# Patient Record
Sex: MALE | Race: WHITE | NOT HISPANIC OR LATINO | Employment: OTHER | URBAN - NONMETROPOLITAN AREA
[De-identification: names, ages, dates, MRNs, and addresses within clinical notes are randomized per-mention and may not be internally consistent; named-entity substitution may affect disease eponyms.]

---

## 2019-10-31 ENCOUNTER — OFFICE VISIT (OUTPATIENT)
Dept: ORTHOPEDIC SURGERY | Facility: CLINIC | Age: 64
End: 2019-10-31

## 2019-10-31 VITALS — WEIGHT: 188 LBS | HEIGHT: 60 IN | TEMPERATURE: 98 F | BODY MASS INDEX: 36.91 KG/M2

## 2019-10-31 DIAGNOSIS — M17.11 PRIMARY OSTEOARTHRITIS OF RIGHT KNEE: Primary | ICD-10-CM

## 2019-10-31 PROCEDURE — 99203 OFFICE O/P NEW LOW 30 MIN: CPT | Performed by: ORTHOPAEDIC SURGERY

## 2019-10-31 RX ORDER — LORAZEPAM 0.5 MG/1
0.5 TABLET ORAL
COMMUNITY
Start: 2019-02-11

## 2019-10-31 RX ORDER — MIRTAZAPINE 15 MG/1
TABLET, FILM COATED ORAL
COMMUNITY
Start: 2019-08-20

## 2019-10-31 RX ORDER — LISINOPRIL 40 MG/1
40 TABLET ORAL DAILY
Refills: 3 | COMMUNITY
Start: 2019-10-14

## 2020-10-28 DIAGNOSIS — M17.11 PRIMARY OSTEOARTHRITIS OF RIGHT KNEE: Primary | ICD-10-CM

## 2020-10-28 PROBLEM — N52.9 ERECTILE DYSFUNCTION: Status: ACTIVE | Noted: 2018-08-16

## 2020-10-28 PROBLEM — F41.9 ANXIETY: Status: ACTIVE | Noted: 2020-10-28

## 2020-10-28 PROBLEM — M65.331 TRIGGER FINGER, RIGHT MIDDLE FINGER: Status: ACTIVE | Noted: 2017-06-20

## 2020-10-28 PROBLEM — R00.1 BRADYCARDIA: Status: ACTIVE | Noted: 2018-02-15

## 2020-10-28 PROBLEM — R91.1 PULMONARY NODULE: Status: ACTIVE | Noted: 2018-02-15

## 2020-10-28 PROBLEM — C61 PROSTATE CANCER (HCC): Status: ACTIVE | Noted: 2018-11-26

## 2020-10-28 PROBLEM — Z85.46 HISTORY OF PROSTATE CANCER: Status: ACTIVE | Noted: 2019-06-25

## 2020-10-28 PROBLEM — IMO0002 MASS: Status: ACTIVE | Noted: 2019-06-25

## 2020-10-28 PROBLEM — M25.561 RIGHT KNEE PAIN: Status: ACTIVE | Noted: 2019-07-23

## 2020-10-28 PROBLEM — M23.8X1 DEFICIENCY OF ANTERIOR CRUCIATE LIGAMENT OF RIGHT KNEE: Status: ACTIVE | Noted: 2019-08-06

## 2020-10-28 PROBLEM — IMO0001 WHITE COAT HYPERTENSION: Status: ACTIVE | Noted: 2017-05-15

## 2020-10-28 PROBLEM — M67.40 GANGLION CYST: Status: ACTIVE | Noted: 2019-08-06

## 2020-10-28 PROBLEM — J44.9 COPD (CHRONIC OBSTRUCTIVE PULMONARY DISEASE) (HCC): Status: ACTIVE | Noted: 2020-10-28

## 2020-10-29 ENCOUNTER — OFFICE VISIT (OUTPATIENT)
Dept: ORTHOPEDIC SURGERY | Facility: CLINIC | Age: 65
End: 2020-10-29

## 2020-10-29 ENCOUNTER — HOSPITAL ENCOUNTER (OUTPATIENT)
Dept: OTHER | Facility: HOSPITAL | Age: 65
Discharge: HOME | End: 2020-10-29
Attending: ORTHOPAEDIC SURGERY

## 2020-10-29 VITALS — WEIGHT: 200 LBS | TEMPERATURE: 98.1 F | BODY MASS INDEX: 25.67 KG/M2 | HEIGHT: 74 IN

## 2020-10-29 DIAGNOSIS — M17.11 PRIMARY OSTEOARTHRITIS OF RIGHT KNEE: Primary | ICD-10-CM

## 2020-10-29 PROCEDURE — 99214 OFFICE O/P EST MOD 30 MIN: CPT | Performed by: ORTHOPAEDIC SURGERY

## 2020-10-29 NOTE — PROGRESS NOTES
FOLLOW UP VISIT    Patient: Chester Figueroa  ?  YOB: 1955    MRN: [id number]  ?  Chief Complaint   Patient presents with   • Right Knee - Pain, Follow-up      ?  HPI: The patient is following up with increasing pain and discomfort of his right knee.  He has a progressive varus deformity of the knee.  He states that he is becoming progressively bowlegged.  As he walks more aggressively his symptoms become worse.  He does have a very prominent osteophyte on the lateral aspect of the knee on the proximal part of the fibula.  The patient was recently diagnosed with prostate cancer and is dealing with the aftermath of that disease.  He would like to proceed with knee replacement surgery once he has been cleared for surgical intervention by his urologist-oncologist.    Pain Location: right knee(s)  Radiation: none  Quality: aching, sharp  Intensity/Severity: mild-moderate  Duration: several years  Onset quality: gradual   Timing: intermittent  Aggravating Factors: going up and down stairs, kneeling, rising after sitting and squatting  Alleviating Factors: OTC analgesics, rest and ice  Previous Episodes: yes  Associated Symptoms: pain, swelling  ADL Affected: ambulating  Previous Treatment: Tylenol    This patient is an established patient.  This problem is not new to this examiner.      Allergies: No Known Allergies    Medications:   Home Medications:  Current Outpatient Medications on File Prior to Visit   Medication Sig   • lisinopril (PRINIVIL,ZESTRIL) 40 MG tablet Take 40 mg by mouth Daily.   • LORazepam (ATIVAN) 0.5 MG tablet Take 0.5 mg by mouth.   • mirtazapine (REMERON) 15 MG tablet TAKE 1 TABLET BY MOUTH EVERY DAY AT NIGHT   • MULTIPLE VITAMINS-MINERALS PO Take  by mouth.     No current facility-administered medications on file prior to visit.      Current Medications:  Scheduled Meds:  PRN Meds:.    I have reviewed the patient's medical history in detail and updated the computerized patient record.   "Review and summarization of old records include:    No past medical history on file.  No past surgical history on file.  Social History     Occupational History   • Not on file   Tobacco Use   • Smoking status: Never Smoker   Substance and Sexual Activity   • Alcohol use: Never     Frequency: Never   • Drug use: Not on file   • Sexual activity: Not on file      Social History     Social History Narrative   • Not on file     No family history on file.      Review of Systems  Constitutional: Negative for appetite change.   HENT: Negative.    Eyes: Negative.    Respiratory: Negative.    Cardiovascular: Negative.    Gastrointestinal: Negative.    Endocrine: Negative.    Genitourinary: Negative.    Musculoskeletal: See details of exam below.  Skin: Negative.    Allergic/Immunologic: Negative.    Hematological: Negative.    Psychiatric/Behavioral: Negative.         Wt Readings from Last 3 Encounters:   10/29/20 90.7 kg (200 lb)   10/31/19 85.3 kg (188 lb)     Ht Readings from Last 3 Encounters:   10/29/20 188 cm (74\")   10/31/19 71 cm (27.95\")     Body mass index is 25.68 kg/m².  Facility age limit for growth percentiles is 20 years.  Vitals:    10/29/20 0924   Temp: 98.1 °F (36.7 °C)         Physical Exam  Constitutional: Patient is oriented to person, place, and time. Appears well-developed and well-nourished.   HENT:   Head: Normocephalic and atraumatic.   Eyes: Conjunctivae and EOM are normal. Pupils are equal, round, and reactive to light.   Cardiovascular: Normal rate, regular rhythm, normal heart sounds and intact distal pulses.   Pulmonary/Chest: Effort normal and breath sounds normal.   Musculoskeletal:   See detailed exam below   Neurological: Alert and oriented to person, place, and time. No sensory deficit. Coordination normal.   Skin: Skin is warm and dry. Capillary refill takes less than 2 seconds. No rash noted. No erythema.   Psychiatric: Patient has a normal mood and affect. His behavior is normal. " Judgment and thought content normal.   Nursing note and vitals reviewed.      Ortho Exam:   Right knee (valgus). Positive grind test. Pain and tenderness is present over the lateral aspect of the knee. Patient has some tenderness and irritability of the common peroneal nerve. Lateral joint line is exquisitely painful and tender. Patella is tending to track a little bit laterally. There is thickening of the synovial membrane. Range of motion in flexion is 0-120 degrees. Dorsalis pedis and posterior tibial artery pulses are palpable. Common peroneal nerve function is well preserved. Gait is cautious and antalgic. The patient has valgus orientation of the knee with a higher degree of external rotation than the contralateral side.There is fullness and tenderness in the Popliteal fossa. Getting out of a seated position is painful for the patient.       Diagnostics:  Right knee xrays ap/lateral views were ordered by Garcia Benito MD and performed at [facility] on 10/29/2020. These images were independently viewed and interpreted by myself, my impression as follows:  right Knee X-Ray  Indication: pain  AP, Lateral views  Findings: Advanced degenerative osteoarthritis with bone-on-bone appearance with complete loss of medial joint space.  Subchondral cyst formation is noted.  no bony lesion  Soft tissues within normal limits  decreased joint spaces  Hardware appropriately positioned not applicable      yes prior studies available for comparison.    This patient's x-ray report was graded according to the Kellgren and Cullen classification.  This took into account the joint space narrowing, osteophyte formation, sclerosis of the distal femur/proximal tibia along with deformity of those bones.  The findings were indicative of K L grade 4.    X-RAY was ordered and reviewed by Garcia Benito MD               Assessment:  Diagnoses and all orders for this visit:    1. Primary osteoarthritis of right knee  (Primary)          Procedures  ?    Plan    · Compression/brace to the knee to prevent it from buckling and giving out.  · Tablet Mobic 7.5 mg tab 1 p.o. nightly for pain swelling and discomfort.  · Rest, ice, compression, and elevation (RICE) therapy  · Stretching and strengthening exercises  · OTC Alternate Ibuprofen and Tylenol as needed  · Follow up in 6 month(s)  · Patient will eventually need a total right knee replacement   The patient was seen today for preoperative discussion.  The patient has been tried on over-the-counter and prescription NSAID's despite the risks of anti-inflammatory bleeding, peptic ulcers and erosive gastritis with short term benefit only.  Braces have been prescribed for mechanical support.  Patient has been participating in an exercise program specifically targeting joint pain relief with limited benefit. Intraarticular injections have been used periodically with some but not complete relief of pain.  Ambulation aids have also been utilized.    Time spent in the office today with the patient is 30 minutes in discussing the surgical intervention.  Greater than 50% of my time was spent face-to-face with the patient going over treatment options and potential complications of the surgical procedure.  · The details of the surgical procedure were explained including the location of probable incisions and a description of the likely hardware/grafts to be used. The patient understands the likely convalescence after surgery as well as the rehabilitation required.  Also, we have thoroughly discussed with the patient the risks, benefits and alternatives to surgery.  Risks include but are not limited to the risk of infection, joint stiffness, limited range of motion, wound healing problems, scar tissue build up, myocardial infarction, stroke, blood clots (including DVT and/or pulmonary embolus along with the risk of death) neurologic and/or vascular injury, limb length discrepancy, fracture,  dislocation, nonunion, malunion, continued pain and need for further surgery including hardware failure requiring revision.     Date of encounter: 10/29/2020   Garcia Benito MD

## 2020-12-30 VITALS
HEART RATE: 53 BPM | DIASTOLIC BLOOD PRESSURE: 73 MMHG | OXYGEN SATURATION: 90 % | WEIGHT: 200 LBS | SYSTOLIC BLOOD PRESSURE: 134 MMHG | SYSTOLIC BLOOD PRESSURE: 130 MMHG | OXYGEN SATURATION: 96 % | HEART RATE: 55 BPM | HEART RATE: 64 BPM | RESPIRATION RATE: 18 BRPM | DIASTOLIC BLOOD PRESSURE: 72 MMHG | HEART RATE: 68 BPM | OXYGEN SATURATION: 95 % | DIASTOLIC BLOOD PRESSURE: 79 MMHG | OXYGEN SATURATION: 98 % | SYSTOLIC BLOOD PRESSURE: 155 MMHG | SYSTOLIC BLOOD PRESSURE: 135 MMHG | HEART RATE: 67 BPM | TEMPERATURE: 96.6 F | RESPIRATION RATE: 14 BRPM | DIASTOLIC BLOOD PRESSURE: 82 MMHG | RESPIRATION RATE: 19 BRPM | DIASTOLIC BLOOD PRESSURE: 70 MMHG | HEART RATE: 71 BPM | SYSTOLIC BLOOD PRESSURE: 126 MMHG | OXYGEN SATURATION: 97 % | HEART RATE: 56 BPM | HEART RATE: 51 BPM | RESPIRATION RATE: 20 BRPM | HEART RATE: 69 BPM | SYSTOLIC BLOOD PRESSURE: 132 MMHG | SYSTOLIC BLOOD PRESSURE: 153 MMHG | DIASTOLIC BLOOD PRESSURE: 84 MMHG | RESPIRATION RATE: 10 BRPM | SYSTOLIC BLOOD PRESSURE: 147 MMHG | SYSTOLIC BLOOD PRESSURE: 139 MMHG | HEIGHT: 74 IN | DIASTOLIC BLOOD PRESSURE: 81 MMHG | OXYGEN SATURATION: 94 % | SYSTOLIC BLOOD PRESSURE: 137 MMHG | OXYGEN SATURATION: 93 % | RESPIRATION RATE: 26 BRPM | OXYGEN SATURATION: 100 % | SYSTOLIC BLOOD PRESSURE: 148 MMHG | DIASTOLIC BLOOD PRESSURE: 80 MMHG | HEART RATE: 59 BPM | DIASTOLIC BLOOD PRESSURE: 90 MMHG | RESPIRATION RATE: 12 BRPM | DIASTOLIC BLOOD PRESSURE: 75 MMHG

## 2021-01-06 ENCOUNTER — OFFICE (AMBULATORY)
Dept: URBAN - METROPOLITAN AREA PATHOLOGY 4 | Facility: PATHOLOGY | Age: 66
End: 2021-01-06
Payer: COMMERCIAL

## 2021-01-06 ENCOUNTER — AMBULATORY SURGICAL CENTER (AMBULATORY)
Dept: URBAN - METROPOLITAN AREA SURGERY 17 | Facility: SURGERY | Age: 66
End: 2021-01-06

## 2021-01-06 DIAGNOSIS — Z86.010 PERSONAL HISTORY OF COLONIC POLYPS: ICD-10-CM

## 2021-01-06 DIAGNOSIS — K62.1 RECTAL POLYP: ICD-10-CM

## 2021-01-06 PROBLEM — K63.89 OTHER SPECIFIED DISEASES OF INTESTINE: Status: ACTIVE | Noted: 2021-01-06

## 2021-01-06 LAB
GI HISTOLOGY: A. UNSPECIFIED: (no result)
GI HISTOLOGY: PDF REPORT: (no result)

## 2021-01-06 PROCEDURE — 45380 COLONOSCOPY AND BIOPSY: CPT | Mod: PT | Performed by: INTERNAL MEDICINE

## 2021-01-06 PROCEDURE — 88305 TISSUE EXAM BY PATHOLOGIST: CPT | Performed by: INTERNAL MEDICINE
